# Patient Record
Sex: MALE | Race: WHITE | Employment: OTHER | URBAN - METROPOLITAN AREA
[De-identification: names, ages, dates, MRNs, and addresses within clinical notes are randomized per-mention and may not be internally consistent; named-entity substitution may affect disease eponyms.]

---

## 2024-09-03 ENCOUNTER — OFFICE VISIT (OUTPATIENT)
Dept: BARIATRICS/WEIGHT MGMT | Age: 64
End: 2024-09-03
Payer: MEDICARE

## 2024-09-03 ENCOUNTER — HOSPITAL ENCOUNTER (OUTPATIENT)
Age: 64
Setting detail: SPECIMEN
Discharge: HOME | End: 2024-09-03

## 2024-09-03 VITALS
HEART RATE: 80 BPM | HEIGHT: 71 IN | BODY MASS INDEX: 36.82 KG/M2 | WEIGHT: 263 LBS | DIASTOLIC BLOOD PRESSURE: 70 MMHG | SYSTOLIC BLOOD PRESSURE: 120 MMHG

## 2024-09-03 DIAGNOSIS — F32.A DEPRESSION, UNSPECIFIED DEPRESSION TYPE: ICD-10-CM

## 2024-09-03 DIAGNOSIS — G47.33 OSA (OBSTRUCTIVE SLEEP APNEA): Primary | ICD-10-CM

## 2024-09-03 DIAGNOSIS — E66.9 OBESITY (BMI 30-39.9): ICD-10-CM

## 2024-09-03 DIAGNOSIS — G47.33 OSA (OBSTRUCTIVE SLEEP APNEA): ICD-10-CM

## 2024-09-03 LAB
BASOPHILS # BLD: 0.04 K/UL (ref 0–0.2)
BASOPHILS NFR BLD: 1 % (ref 0–2)
EOSINOPHIL # BLD: 0.22 K/UL (ref 0–0.44)
EOSINOPHILS RELATIVE PERCENT: 5 % (ref 1–4)
ERYTHROCYTE [DISTWIDTH] IN BLOOD BY AUTOMATED COUNT: 14.6 % (ref 11.8–14.4)
EST. AVERAGE GLUCOSE BLD GHB EST-MCNC: 128 MG/DL
HBA1C MFR BLD: 6.1 % (ref 4–6)
HCT VFR BLD AUTO: 46.3 % (ref 40.7–50.3)
HGB BLD-MCNC: 15.4 G/DL (ref 13–17)
IMM GRANULOCYTES # BLD AUTO: <0.03 K/UL (ref 0–0.3)
IMM GRANULOCYTES NFR BLD: 0 %
LYMPHOCYTES NFR BLD: 1.11 K/UL (ref 1.1–3.7)
LYMPHOCYTES RELATIVE PERCENT: 23 % (ref 24–43)
MCH RBC QN AUTO: 29.2 PG (ref 25.2–33.5)
MCHC RBC AUTO-ENTMCNC: 33.3 G/DL (ref 28.4–34.8)
MCV RBC AUTO: 87.9 FL (ref 82.6–102.9)
MONOCYTES NFR BLD: 0.47 K/UL (ref 0.1–1.2)
MONOCYTES NFR BLD: 10 % (ref 3–12)
NEUTROPHILS NFR BLD: 61 % (ref 36–65)
NEUTS SEG NFR BLD: 2.91 K/UL (ref 1.5–8.1)
NRBC BLD-RTO: 0 PER 100 WBC
PLATELET # BLD AUTO: 165 K/UL (ref 138–453)
PMV BLD AUTO: 10.5 FL (ref 8.1–13.5)
RBC # BLD AUTO: 5.27 M/UL (ref 4.21–5.77)
RBC # BLD: ABNORMAL 10*6/UL
WBC OTHER # BLD: 4.8 K/UL (ref 3.5–11.3)

## 2024-09-03 PROCEDURE — 99204 OFFICE O/P NEW MOD 45 MIN: CPT | Performed by: NURSE PRACTITIONER

## 2024-09-03 PROCEDURE — 1123F ACP DISCUSS/DSCN MKR DOCD: CPT | Performed by: NURSE PRACTITIONER

## 2024-09-03 RX ORDER — FAMOTIDINE 20 MG/1
20 TABLET, FILM COATED ORAL NIGHTLY PRN
COMMUNITY
Start: 2022-01-20

## 2024-09-03 RX ORDER — IPRATROPIUM BROMIDE 21 UG/1
2 SPRAY, METERED NASAL 2 TIMES DAILY
COMMUNITY
Start: 2017-04-10

## 2024-09-03 RX ORDER — ALLOPURINOL 100 MG/1
1 TABLET ORAL
COMMUNITY

## 2024-09-03 RX ORDER — TESTOSTERONE CYPIONATE 1000 MG/10ML
0.5 INJECTION, SOLUTION INTRAMUSCULAR
COMMUNITY

## 2024-09-03 RX ORDER — ZOLPIDEM TARTRATE 12.5 MG/1
12.5 TABLET, FILM COATED, EXTENDED RELEASE ORAL NIGHTLY PRN
COMMUNITY
Start: 1900-01-01

## 2024-09-03 RX ORDER — TAMSULOSIN HYDROCHLORIDE 0.4 MG/1
0.4 CAPSULE ORAL DAILY
COMMUNITY
Start: 2017-08-20

## 2024-09-03 RX ORDER — MIRABEGRON 50 MG/1
50 TABLET, EXTENDED RELEASE ORAL DAILY
COMMUNITY

## 2024-09-03 RX ORDER — LANSOPRAZOLE 30 MG/1
30 CAPSULE, DELAYED RELEASE ORAL DAILY
COMMUNITY

## 2024-09-03 NOTE — PROGRESS NOTES
[facility], [facility] INVASIVE BARIATRIC SURG  [address]  [address]  [place] [id number]  Dept: [phone]  Fax: [phone]    MEDICATION WEIGHT LOSS MANAGEMENT PROGRAM  PROGRESS NOTE     Subjective:      Chief Complaint   Patient presents with    Consultation    Discuss Medications    Weight Loss         Eber Escobar is a 63 y.o. male who is here today for evaluation and treatment of obesity. Patient cites health, increased physical ability as reasons for wanting to lose weight.    He states that he has recently retired. He states since the beginning of the year, he has struggled with stress and depression partially related to his job but also family issues and losses. He is starting to feel more hopeful now that he is retired and that stress is gone.     Obesity History    Period of greatest weight gain: approx 25 lbs during late adult years  Lowest adult weight: 200 lbs at age 28 with increased activity and a better diet  Highest adult weight: 275 lbs at age 63 lbs d/t stress, depression, inactivity and increased hungry  Amount of time at present weight: a couple of months      History of Weight Loss Efforts  Greatest amount of weight lost: 46 lbs in mid adult years with [facility]  That worked for several years but he states they ended up moving out of the area.   Successful weight loss techniques attempted:  [facility] - (metabolic profile system)      He was on adipex last year from August til November - he is unsure if he lost any weight during that time.     He has been on Keto, WW, atkins, nutrisystem, calorie restriction, low fat diet, low carb diet, Noom. He states they have all been successful to an extent. They work while he is on them but its not sustained.     Current Exercise Habits no regular exercise    Current Eating Habits  Number of regular meals per day: 2.5  Number of snacking episodes per day: several  Who shops for food? patient and wife  Who

## 2024-09-04 LAB
ALBUMIN SERPL-MCNC: 4.4 G/DL (ref 3.5–5.2)
ALBUMIN/GLOB SERPL: 2 {RATIO} (ref 1–2.5)
ALP SERPL-CCNC: 59 U/L (ref 40–129)
ALT SERPL-CCNC: 34 U/L (ref 10–50)
ANION GAP SERPL CALCULATED.3IONS-SCNC: 12 MMOL/L (ref 9–16)
AST SERPL-CCNC: 37 U/L (ref 10–50)
BILIRUB SERPL-MCNC: 0.6 MG/DL (ref 0–1.2)
BUN SERPL-MCNC: 23 MG/DL (ref 8–23)
CALCIUM SERPL-MCNC: 9.1 MG/DL (ref 8.6–10.4)
CHLORIDE SERPL-SCNC: 106 MMOL/L (ref 98–107)
CO2 SERPL-SCNC: 20 MMOL/L (ref 20–31)
CREAT SERPL-MCNC: 1.4 MG/DL (ref 0.7–1.2)
GFR, ESTIMATED: 56 ML/MIN/1.73M2
GLUCOSE SERPL-MCNC: 86 MG/DL (ref 74–99)
POTASSIUM SERPL-SCNC: 4.3 MMOL/L (ref 3.7–5.3)
PROT SERPL-MCNC: 6.7 G/DL (ref 6.6–8.7)
SODIUM SERPL-SCNC: 138 MMOL/L (ref 136–145)
T4 FREE SERPL-MCNC: 0.9 NG/DL (ref 0.92–1.68)
TSH SERPL DL<=0.05 MIU/L-ACNC: 1.58 UIU/ML (ref 0.27–4.2)

## 2024-10-08 DIAGNOSIS — G47.33 OSA (OBSTRUCTIVE SLEEP APNEA): ICD-10-CM

## 2024-10-08 DIAGNOSIS — E66.9 OBESITY (BMI 30-39.9): ICD-10-CM

## 2024-10-08 NOTE — TELEPHONE ENCOUNTER
We will see him tomorrow and see how he is doing with it the medication. Even if I send the order over today, they wont start compounding until Thursday. If I send it over tomorrow, they will still compound it Thursday. All prescriptions sent Monday, Tuesday or Wednesday are compounded on the same day. If I do this refill, he will be on the starter dose and will need to wait another month for a dose increase

## 2024-10-08 NOTE — TELEPHONE ENCOUNTER
Has appt10/09/24 asking if script can be sent in ASAP. He has been out of town and just returned.

## 2024-10-09 ENCOUNTER — OFFICE VISIT (OUTPATIENT)
Dept: BARIATRICS/WEIGHT MGMT | Age: 64
End: 2024-10-09
Payer: MEDICARE

## 2024-10-09 VITALS
WEIGHT: 253 LBS | DIASTOLIC BLOOD PRESSURE: 72 MMHG | SYSTOLIC BLOOD PRESSURE: 132 MMHG | HEIGHT: 71 IN | OXYGEN SATURATION: 96 % | HEART RATE: 82 BPM | BODY MASS INDEX: 35.42 KG/M2

## 2024-10-09 DIAGNOSIS — E66.9 OBESITY (BMI 30-39.9): ICD-10-CM

## 2024-10-09 DIAGNOSIS — R73.03 PREDIABETES: Primary | ICD-10-CM

## 2024-10-09 DIAGNOSIS — G47.33 OSA (OBSTRUCTIVE SLEEP APNEA): ICD-10-CM

## 2024-10-09 DIAGNOSIS — N18.31 STAGE 3A CHRONIC KIDNEY DISEASE (HCC): ICD-10-CM

## 2024-10-09 PROCEDURE — 1123F ACP DISCUSS/DSCN MKR DOCD: CPT | Performed by: NURSE PRACTITIONER

## 2024-10-09 PROCEDURE — 99214 OFFICE O/P EST MOD 30 MIN: CPT | Performed by: NURSE PRACTITIONER

## 2024-10-09 RX ORDER — AZELASTINE 1 MG/ML
SPRAY, METERED NASAL
COMMUNITY

## 2024-10-09 RX ORDER — SYRINGE WITH NEEDLE, 1 ML 25GX5/8"
SYRINGE, EMPTY DISPOSABLE MISCELLANEOUS
COMMUNITY
Start: 2024-09-13

## 2024-10-09 RX ORDER — NEEDLES, DISPOSABLE 25GX5/8"
NEEDLE, DISPOSABLE MISCELLANEOUS
COMMUNITY
Start: 2024-09-14

## 2024-10-09 RX ORDER — ALPRAZOLAM 0.5 MG
TABLET ORAL
COMMUNITY

## 2024-10-09 ASSESSMENT — ENCOUNTER SYMPTOMS
VOMITING: 0
NAUSEA: 0
COUGH: 0
CONSTIPATION: 0
WHEEZING: 0
ABDOMINAL PAIN: 0
CHEST TIGHTNESS: 0
SHORTNESS OF BREATH: 0

## 2024-10-09 NOTE — PROGRESS NOTES
[facility] INVASIVE BARIATRIC SURG  [address]  [address]  [place] [id number]  Dept: [phone]    MEDICATION WEIGHT LOSS MANAGEMENT PROGRAM  PROGRESS NOTE     CC: Weight Loss    Patient: Eber Escobar      Service Date: 10/9/2024  Visit:   1 month follow up on medications    Medical Record #: [id number]  Current Visit Weight Information  Weight: 114.8 kg (253 lb)   BMI: Body mass index is 35.29 kg/m².        History: 63 y.o. male who presents today for a 1 month follow up on weight loss medication. Patient has been following monthly with our office for the management of obesity since September 2024.    Medication: Semaglutide through [facility]  Amount of weight loss in the last month: 10 lbs  Amount of weight loss total: 10 lbs  Current dose: 0.3 mg  Side Effects: nausea initially but resolved now. Otherwise tolerated well.     He states he was getting stephens quicker, hunger was down a lot. He states it was nice to not feel hungry all the time. He states the nausea he had was only 1 night and he thinks he might have overate. He states he is feeling more satified with smaller portions. He states before the medication, when he felt hungry he would load up his plate and have large portions to try and satifiy the hunger.     He has been working on increasing the protein in his diet.     Labs work completed - + CKD stage 3 which he states PCP has been watching along with urology. It appears stable. A1C did show prediabetes. He has an appt scheduled on 10/31 with PCP. He states he also needs to schedule with urology.     Height: 1.803 m (5' 11\")  Highest Weight:   263 lbs      Exercising?   [x] Yes    [] No   He went hiking for a couple of weeks this past month  Review of Systems:     Review of Systems   Constitutional:  Negative for appetite change, chills, diaphoresis, fatigue and fever.        + obesity   Respiratory:  Negative 
for 3 meals and 1-2 snacks daily.    Discuss activity with physician and determine a plan for remaining active.    Monitor/Evaluate:  Diet tolerance, protein intake, fluid intake, frequency of meals/snacks, activity, and follow-up monthly/prn.      Meena Forrest MS, RD, LD  Clinical Dietitian  [facility]  [phone]

## 2024-11-06 ENCOUNTER — OFFICE VISIT (OUTPATIENT)
Dept: BARIATRICS/WEIGHT MGMT | Age: 64
End: 2024-11-06
Payer: MEDICARE

## 2024-11-06 VITALS
WEIGHT: 249 LBS | HEART RATE: 90 BPM | BODY MASS INDEX: 34.86 KG/M2 | SYSTOLIC BLOOD PRESSURE: 100 MMHG | HEIGHT: 71 IN | DIASTOLIC BLOOD PRESSURE: 66 MMHG

## 2024-11-06 DIAGNOSIS — R73.03 PREDIABETES: Primary | ICD-10-CM

## 2024-11-06 DIAGNOSIS — E66.9 OBESITY (BMI 30-39.9): ICD-10-CM

## 2024-11-06 DIAGNOSIS — G47.33 OSA (OBSTRUCTIVE SLEEP APNEA): ICD-10-CM

## 2024-11-06 PROCEDURE — 99213 OFFICE O/P EST LOW 20 MIN: CPT | Performed by: NURSE PRACTITIONER

## 2024-11-06 PROCEDURE — 1123F ACP DISCUSS/DSCN MKR DOCD: CPT | Performed by: NURSE PRACTITIONER

## 2024-11-06 PROCEDURE — 1159F MED LIST DOCD IN RCRD: CPT | Performed by: NURSE PRACTITIONER

## 2024-11-06 ASSESSMENT — ENCOUNTER SYMPTOMS
WHEEZING: 0
CHEST TIGHTNESS: 0
VOMITING: 0
ABDOMINAL PAIN: 0
NAUSEA: 0
COUGH: 0
CONSTIPATION: 0
SHORTNESS OF BREATH: 0

## 2024-11-06 NOTE — PROGRESS NOTES
[facility] INVASIVE BARIATRIC SURG  [address]  [address]  [facility] [address]  Dept: [phone]    MEDICATION WEIGHT LOSS MANAGEMENT PROGRAM  PROGRESS NOTE     CC: Weight Loss    Patient: Eber Escobar      Service Date: 11/6/2024  Visit:   1 month follow up on medications    Medical Record #: [id number]  Current Visit Weight Information  Weight: 112.9 kg (249 lb)   BMI: Body mass index is 34.73 kg/m².        History: 64 y.o. male who presents today for a 1 month follow up on weight loss medication. Patient has been following monthly with our office for the management of obesity since September 2024.    Medication: Semaglutide through [facility]  Amount of weight loss in the last month: 4 lbs  Amount of weight loss total: 14 lbs  Current dose: 0.6 mg  Side Effects: none.     Denies nausea, vomiting, abdominal pain. He states he has felt more tired recently.     He admits this past month he struggled with a lot of discipline when it came to halloween time. He states they bought a lot of candy to pass out to trick or treaters but it rained so he got stuck with a lot of candy at home.  When he first started the medication he felt like smaller portions were more satisfying but he does not always feel that way despite having the dose increase last month.  He has had a hard time controlling his cravings.  He also states that his birthday was recently so he did not make the best choices around that time either.    He has been working on increasing the protein in his diet.       Height: 1.803 m (5' 11\")  Highest Weight:   263 lbs      Exercising?   [] Yes    [x] No   He states he is walking on average between 9867-1121 steps per day, active around the house and with yard work.    He admits that once he retired he wanted to start being more active and exercising routinely but he has not started that yet.  Review of Systems:     Review of

## 2024-11-06 NOTE — PROGRESS NOTES
Medical Nutrition Therapy for Non-Surgical Weight Loss  Nutrition Follow-Up: Weight Loss Medication    Nutrition Assessment:  Patient is taking semaglutide per PAOLA Johnson for weight loss. Patient has lost 4 lbs since last visit.   Patient's nutrition questions include suggestions for breakfast proteins.  Patient reports changes made since last month include tired, no energy and pain in legs/back.  Patient is eating three meals plus 1-2 snacks per day. Improved fruit and vegetable intake.  Patient is drinking at least  oz of water. Patient is typically drinking water, coffee with half and half. Sometimes drinks milk 2%. Alcohol more frequently on vacation but now decreased greatly.   Patient reports walking, yard work to remain active.  Discussed alternate breakfast options.  Provided Breakfast Ideas Handout(s).    24-hr diet recall scanned into chart.    Vitals:   Vitals:    11/06/24 1356   BP: 100/66   Site: Right Upper Arm   Position: Sitting   Cuff Size: Large Adult   Pulse: 90   Weight: 112.9 kg (249 lb)   Height: 1.803 m (5' 11\")      Body mass index is 34.73 kg/m².    Estimated Energy Needs:  Calorie (MSJ x AF - 500): 1800 kcals  Protein: 60-80 grams protein  Fluids: minimum 64 oz fluid    Nutrition Diagnosis:  Overweight/Obesity related to complex combination of decreased energy needs, disordered eating patterns, physical inactivity, and increased psychological/life stress as evidenced by Body mass index is 34.73 kg/m².    Nutrition Intervention:  Diet: Low-Fat Diet, 60-80 gm of protein, and 64 oz of fluid/day.    Nutrition Education: Nutrition Care Manual    Goal:   Eat a variety of fruits and vegetables, lean protein, whole grains and low-fat dairy.   Sip on water or sugar-free fluid throughout the day.   Aim for 3 meals and 1-2 snacks daily.    Discuss activity with physician and determine a plan for remaining active.    Monitor/Evaluate:  Diet tolerance, protein intake, fluid intake,

## 2024-12-04 ENCOUNTER — OFFICE VISIT (OUTPATIENT)
Dept: BARIATRICS/WEIGHT MGMT | Age: 64
End: 2024-12-04
Payer: MEDICARE

## 2024-12-04 VITALS
HEIGHT: 71 IN | SYSTOLIC BLOOD PRESSURE: 130 MMHG | WEIGHT: 257 LBS | BODY MASS INDEX: 35.98 KG/M2 | HEART RATE: 80 BPM | DIASTOLIC BLOOD PRESSURE: 70 MMHG

## 2024-12-04 DIAGNOSIS — R73.03 PREDIABETES: Primary | ICD-10-CM

## 2024-12-04 DIAGNOSIS — E66.9 OBESITY (BMI 30-39.9): ICD-10-CM

## 2024-12-04 DIAGNOSIS — G47.33 OSA (OBSTRUCTIVE SLEEP APNEA): ICD-10-CM

## 2024-12-04 PROCEDURE — 1123F ACP DISCUSS/DSCN MKR DOCD: CPT | Performed by: NURSE PRACTITIONER

## 2024-12-04 PROCEDURE — 1159F MED LIST DOCD IN RCRD: CPT | Performed by: NURSE PRACTITIONER

## 2024-12-04 PROCEDURE — 99214 OFFICE O/P EST MOD 30 MIN: CPT | Performed by: NURSE PRACTITIONER

## 2024-12-04 ASSESSMENT — ENCOUNTER SYMPTOMS
ABDOMINAL PAIN: 0
CONSTIPATION: 0
COUGH: 0
SHORTNESS OF BREATH: 0
WHEEZING: 0
VOMITING: 0
CHEST TIGHTNESS: 0
NAUSEA: 0

## 2024-12-04 NOTE — PROGRESS NOTES
Medical Nutrition Therapy for Non-Surgical Weight Loss  Nutrition Follow-Up: Weight Loss Medication    Nutrition Assessment:  Patient is taking semaglutide per PAOLA Johnson for weight loss. Patient has lost 0 lbs since last visit, +8 lbs. Patient was off the medication for 2 weeks.   Patient is new to [facility].  Patient reports changes made since last month include not moving as much, did not focus on protein, was overeating without following previous nutrition medications.   Patient is eating 3 meals with 1-2 snacks.  Patient is drinking at least  oz of water. Patient is typically drinking water, coffee with half and half. Sometimes drinks milk 2%. Alcohol more frequently on vacation but now decreased greatly.   Patient reports not adding physical activity to daily life to remain active.   Discussed goal of focusing on protein, staying away from sugary foods. Reiterated importance of MyPlate if off of medication. Discussed giving self a routine/building new habits.    24-hr diet recall scanned into chart.    Vitals:   Vitals:    12/04/24 1036   BP: 130/70   Site: Right Upper Arm   Position: Sitting   Cuff Size: Large Adult   Pulse: 80   Weight: 116.6 kg (257 lb)   Height: 1.803 m (5' 11\")      Body mass index is 35.84 kg/m².    Estimated Energy Needs:  Calorie (MSJ x AF - 500): 1800 kcals  Protein: 60-80 grams protein  Fluids: minimum 64 oz fluid    Nutrition Diagnosis:  Overweight/Obesity related to complex combination of decreased energy needs, disordered eating patterns, physical inactivity, and increased psychological/life stress as evidenced by Body mass index is 35.84 kg/m².    Nutrition Intervention:  Diet: Low-Fat Diet, 60-80 gm of protein, and 64 oz of fluid/day.    Nutrition Education: Nutrition Care Manual    Goal:   Eat a variety of fruits and vegetables, lean protein, whole grains and low-fat dairy.   Sip on water or sugar-free fluid throughout the day.   Aim for 3 meals and 1-2 
the lack of discipline that comes from not working.       Height: 1.803 m (5' 11\")  Highest Weight:   263 lbs      Exercising?   [] Yes    [x] No   He states he is walking on average between 9261-4176 steps per day, active around the house and with yard work.    He admits that once he retired he wanted to start being more active and exercising routinely but he has not started that yet.  Review of Systems:     Review of Systems   Constitutional:  Negative for appetite change, chills, diaphoresis, fatigue and fever.        + obesity   Respiratory:  Negative for cough, chest tightness, shortness of breath and wheezing.    Cardiovascular:  Negative for chest pain and palpitations.   Gastrointestinal:  Negative for abdominal pain, constipation, nausea and vomiting.   Neurological:  Negative for dizziness, weakness, light-headedness and headaches.   Psychiatric/Behavioral:  Negative for agitation, dysphoric mood and sleep disturbance. The patient is not nervous/anxious.    All other systems reviewed and are negative.      Physical Assessment:     /70 (Site: Right Upper Arm, Position: Sitting, Cuff Size: Large Adult)   Pulse 80   Ht 1.803 m (5' 11\")   Wt 116.6 kg (257 lb)   BMI 35.84 kg/m²     Physical Exam  Vitals and nursing note reviewed.   Constitutional:       General: He is not in acute distress.     Appearance: Normal appearance. He is obese. He is not ill-appearing, toxic-appearing or diaphoretic.   HENT:      Head: Normocephalic and atraumatic.      Mouth/Throat:      Mouth: Mucous membranes are moist.   Cardiovascular:      Rate and Rhythm: Normal rate.   Pulmonary:      Effort: Pulmonary effort is normal.   Musculoskeletal:      Cervical back: Neck supple.   Skin:     General: Skin is warm and dry.   Neurological:      General: No focal deficit present.      Mental Status: He is alert and oriented to person, place, and time.      Gait: Gait normal.   Psychiatric:         Mood and Affect: Mood normal.

## 2024-12-30 ENCOUNTER — OFFICE VISIT (OUTPATIENT)
Dept: BARIATRICS/WEIGHT MGMT | Age: 64
End: 2024-12-30
Payer: MEDICARE

## 2024-12-30 ENCOUNTER — HOSPITAL ENCOUNTER (OUTPATIENT)
Age: 64
Setting detail: SPECIMEN
Discharge: HOME | End: 2024-12-30

## 2024-12-30 VITALS
HEIGHT: 71 IN | BODY MASS INDEX: 35.84 KG/M2 | WEIGHT: 256 LBS | DIASTOLIC BLOOD PRESSURE: 64 MMHG | HEART RATE: 80 BPM | SYSTOLIC BLOOD PRESSURE: 120 MMHG

## 2024-12-30 DIAGNOSIS — G47.30 SEVERE SLEEP APNEA: ICD-10-CM

## 2024-12-30 DIAGNOSIS — R53.83 FATIGUE, UNSPECIFIED TYPE: ICD-10-CM

## 2024-12-30 DIAGNOSIS — G47.30 SEVERE SLEEP APNEA: Primary | ICD-10-CM

## 2024-12-30 DIAGNOSIS — N18.31 STAGE 3A CHRONIC KIDNEY DISEASE (HCC): ICD-10-CM

## 2024-12-30 DIAGNOSIS — E66.9 OBESITY (BMI 30-39.9): ICD-10-CM

## 2024-12-30 LAB
25(OH)D3 SERPL-MCNC: 40.2 NG/ML (ref 30–100)
ALBUMIN SERPL-MCNC: 4.4 G/DL (ref 3.5–5.2)
ALBUMIN/GLOB SERPL: 1.9 {RATIO} (ref 1–2.5)
ALP SERPL-CCNC: 60 U/L (ref 40–129)
ALT SERPL-CCNC: 35 U/L (ref 10–50)
ANION GAP SERPL CALCULATED.3IONS-SCNC: 12 MMOL/L (ref 9–16)
AST SERPL-CCNC: 25 U/L (ref 10–50)
BASOPHILS # BLD: 0.05 K/UL (ref 0–0.2)
BASOPHILS NFR BLD: 1 % (ref 0–2)
BILIRUB SERPL-MCNC: 0.6 MG/DL (ref 0–1.2)
BUN SERPL-MCNC: 17 MG/DL (ref 8–23)
CALCIUM SERPL-MCNC: 9.5 MG/DL (ref 8.6–10.4)
CHLORIDE SERPL-SCNC: 102 MMOL/L (ref 98–107)
CO2 SERPL-SCNC: 25 MMOL/L (ref 20–31)
CREAT SERPL-MCNC: 1.5 MG/DL (ref 0.7–1.2)
EOSINOPHIL # BLD: 0.27 K/UL (ref 0–0.44)
EOSINOPHILS RELATIVE PERCENT: 4 % (ref 1–4)
ERYTHROCYTE [DISTWIDTH] IN BLOOD BY AUTOMATED COUNT: 13.5 % (ref 11.8–14.4)
FOLATE SERPL-MCNC: 29.6 NG/ML (ref 4.8–24.2)
GFR, ESTIMATED: 51 ML/MIN/1.73M2
GLUCOSE SERPL-MCNC: 80 MG/DL (ref 74–99)
HCT VFR BLD AUTO: 50.9 % (ref 40.7–50.3)
HGB BLD-MCNC: 17.1 G/DL (ref 13–17)
IMM GRANULOCYTES # BLD AUTO: 0.03 K/UL (ref 0–0.3)
IMM GRANULOCYTES NFR BLD: 0 %
LYMPHOCYTES NFR BLD: 1.52 K/UL (ref 1.1–3.7)
LYMPHOCYTES RELATIVE PERCENT: 21 % (ref 24–43)
MAGNESIUM SERPL-MCNC: 2 MG/DL (ref 1.6–2.4)
MCH RBC QN AUTO: 29.5 PG (ref 25.2–33.5)
MCHC RBC AUTO-ENTMCNC: 33.6 G/DL (ref 28.4–34.8)
MCV RBC AUTO: 87.9 FL (ref 82.6–102.9)
MONOCYTES NFR BLD: 0.65 K/UL (ref 0.1–1.2)
MONOCYTES NFR BLD: 9 % (ref 3–12)
NEUTROPHILS NFR BLD: 65 % (ref 36–65)
NEUTS SEG NFR BLD: 4.76 K/UL (ref 1.5–8.1)
NRBC BLD-RTO: 0 PER 100 WBC
PLATELET # BLD AUTO: 197 K/UL (ref 138–453)
PMV BLD AUTO: 10.1 FL (ref 8.1–13.5)
POTASSIUM SERPL-SCNC: 4.6 MMOL/L (ref 3.7–5.3)
PROT SERPL-MCNC: 6.7 G/DL (ref 6.6–8.7)
RBC # BLD AUTO: 5.79 M/UL (ref 4.21–5.77)
SODIUM SERPL-SCNC: 139 MMOL/L (ref 136–145)
VIT B12 SERPL-MCNC: 710 PG/ML (ref 232–1245)
WBC OTHER # BLD: 7.3 K/UL (ref 3.5–11.3)

## 2024-12-30 PROCEDURE — 1123F ACP DISCUSS/DSCN MKR DOCD: CPT | Performed by: NURSE PRACTITIONER

## 2024-12-30 PROCEDURE — 1159F MED LIST DOCD IN RCRD: CPT | Performed by: NURSE PRACTITIONER

## 2024-12-30 PROCEDURE — 99214 OFFICE O/P EST MOD 30 MIN: CPT | Performed by: NURSE PRACTITIONER

## 2024-12-30 RX ORDER — TIRZEPATIDE 2.5 MG/.5ML
INJECTION, SOLUTION SUBCUTANEOUS
Qty: 2 ML | Refills: 0 | Status: SHIPPED | OUTPATIENT
Start: 2024-12-30

## 2024-12-30 RX ORDER — TIRZEPATIDE 2.5 MG/.5ML
2.5 INJECTION, SOLUTION SUBCUTANEOUS WEEKLY
Qty: 2 ML | Refills: 0 | Status: SHIPPED | OUTPATIENT
Start: 2024-12-30 | End: 2024-12-30

## 2024-12-30 ASSESSMENT — ENCOUNTER SYMPTOMS
COUGH: 0
ABDOMINAL PAIN: 0
CHEST TIGHTNESS: 0
WHEEZING: 0
VOMITING: 0
SHORTNESS OF BREATH: 0
CONSTIPATION: 0
NAUSEA: 0

## 2024-12-30 NOTE — TELEPHONE ENCOUNTER
Please request that the pharmacy send over the prior auth. On 12/20 it was approved for FERNY and supposed to be covered on Medicare

## 2024-12-30 NOTE — PROGRESS NOTES
[facility] INVASIVE BARIATRIC SURG  [address]  [address]  [place] [id number]  Dept: [phone]    MEDICATION WEIGHT LOSS MANAGEMENT PROGRAM  PROGRESS NOTE     CC: Weight Loss    Patient: Eber Escobar      Service Date: 12/30/2024  Visit:   1 month follow up on medications    Medical Record #: [id number]  Current Visit Weight Information  Weight: 116.1 kg (256 lb)   BMI: Body mass index is 35.7 kg/m².        History: 64 y.o. male who presents today for a 1 month follow up on weight loss medication. Patient has been following monthly with our office for the management of obesity since September 2024.    Medication: Semaglutide through [facility]  Amount of weight loss in the last month: 1 lbs  Amount of weight loss total: 7 lbs  Current dose: 1.2 mg  Side Effects: nausea.     He denies diarrhea, constipation, abdomen pain.     Patient complains of feeling tired and he states \"lethargic\". He has been saying that his fatigue is possibly related to him having a lack of structure since he retired but he has been consistent with the fatigue compliant since he started the medication also.     He has noticed also that he is feeling shaky prior to eating. He has a glucometer at home but his equipment is outdated so he hasn't checked his blood sugars. He is prediabetic but not on any other medication that could be causing hypoglycemia.    He states he recently heard that zepbound got approved for the treatment of FERNY. He has had severe FERNY for the past 20 years. He is complaint with his cpap nightly. He states he is able to see the occurrence of hypoxia on his machine and it states 0.6 events per hour. He states if he remembers correctly, before his cpap he was having over 20 evens per hour. He sees Dr. Clark for pulmonology through [facility].      Height: 1.803 m (5' 11\")  Highest Weight:   263 lbs      Exercising?   [] Yes    [x] No

## 2024-12-30 NOTE — TELEPHONE ENCOUNTER
Called [facility] and spoke with Atif the pharmacist who will fax the prior auth to us

## 2025-01-01 LAB — VIT B1 PYROPHOSHATE BLD-SCNC: 165 NMOL/L (ref 70–180)

## 2025-01-02 RX ORDER — TIRZEPATIDE 2.5 MG/.5ML
INJECTION, SOLUTION SUBCUTANEOUS
Refills: 0 | OUTPATIENT
Start: 2025-01-02

## 2025-01-27 ENCOUNTER — OFFICE VISIT (OUTPATIENT)
Dept: BARIATRICS/WEIGHT MGMT | Age: 65
End: 2025-01-27
Payer: MEDICARE

## 2025-01-27 VITALS
DIASTOLIC BLOOD PRESSURE: 70 MMHG | BODY MASS INDEX: 36.82 KG/M2 | HEIGHT: 71 IN | WEIGHT: 263 LBS | HEART RATE: 90 BPM | SYSTOLIC BLOOD PRESSURE: 100 MMHG

## 2025-01-27 DIAGNOSIS — G47.30 SEVERE SLEEP APNEA: Primary | ICD-10-CM

## 2025-01-27 DIAGNOSIS — R53.83 FATIGUE, UNSPECIFIED TYPE: ICD-10-CM

## 2025-01-27 DIAGNOSIS — E66.9 OBESITY (BMI 30-39.9): ICD-10-CM

## 2025-01-27 DIAGNOSIS — N18.31 STAGE 3A CHRONIC KIDNEY DISEASE (HCC): ICD-10-CM

## 2025-01-27 PROCEDURE — 1159F MED LIST DOCD IN RCRD: CPT | Performed by: NURSE PRACTITIONER

## 2025-01-27 PROCEDURE — 1123F ACP DISCUSS/DSCN MKR DOCD: CPT | Performed by: NURSE PRACTITIONER

## 2025-01-27 PROCEDURE — 99214 OFFICE O/P EST MOD 30 MIN: CPT | Performed by: NURSE PRACTITIONER

## 2025-01-27 ASSESSMENT — ENCOUNTER SYMPTOMS
COUGH: 0
WHEEZING: 0
ABDOMINAL PAIN: 0
CONSTIPATION: 0
CHEST TIGHTNESS: 0
VOMITING: 0
SHORTNESS OF BREATH: 0
NAUSEA: 0

## 2025-01-27 NOTE — PROGRESS NOTES
[facility] INVASIVE BARIATRIC SURG  [address]  [address]  [facility] [address]  Dept: [phone]    MEDICATION WEIGHT LOSS MANAGEMENT PROGRAM  PROGRESS NOTE     CC: Weight Loss    Patient: Eber Escobar      Service Date: 1/27/2025  Visit:   1 month follow up on medications    Medical Record #: [id number]  Current Visit Weight Information  Weight: 119.3 kg (263 lb)   BMI: Body mass index is 36.68 kg/m².        History: 64 y.o. male who presents today for a 1 month follow up on weight loss medication. Patient has been following monthly with our office for the management of obesity since September 2024.    Patient was taking Semaglutide through [facility] from September to December. He had lost 7 lbs but he was having a lot of fatigue and feeling shaky at times. He was up to 1.2mg and in the last couple of months didn't have much weight loss.   The fatigue was also associated with feeling down, low energy. 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     Zepbound had gotten approved for FERNY and he has severe FERNY. I advised to stop compounded semaglutide and I worked on getting zepbound approved. I was able to get the medication approved and he has started the 2.5mg dose. He states his appetite is really high right now but he is feeling stephens quicker.     Unfortunately, he still feels a lot of fatigue, low energy and sleepiness in the afternoon. It appears like it was likely not related to the semaglutide but happy he is able to start zepbound as it is much more affordable.    He states he is getting up every 2-2 1/2 hours to go urinate - he has an appt with urology coming up.   He has needed to start using his old BIPAP

## 2025-01-27 NOTE — PROGRESS NOTES
Medical Nutrition Therapy for Non-Surgical Weight Loss  Nutrition Follow-Up: Weight Loss Medication    Nutrition Assessment:  Patient is taking zepbound per PAOLA Johnson for weight loss. Patient has lost 0 lbs since last visit. Patient is on a smaller dose than the semaglutide so is still feeling lots of hunger.  Patient reports lost of poor choices since last appointment.   Patient is eating 3 meals daily with no snacks. Patient reports will feel hungry after dinner..  Patient is drinking at least  oz of water. Patient is typically drinking water, coffee with half and half, occasionally milk 2%.   Patient reports not adding physical activity to daily life to remain active. Patient reports plan to walk 3 days this week.  Discussed addition of snacks in between meals if feeling hungry at night, different exercise options    24-hr diet recall scanned into chart.    Vitals:   Vitals:    01/27/25 1317   BP: 100/70   Site: Right Upper Arm   Position: Sitting   Cuff Size: Large Adult   Pulse: 90   Weight: 119.3 kg (263 lb)   Height: 1.803 m (5' 11\")      Body mass index is 36.68 kg/m².    Estimated Energy Needs:  Calorie (MSJ x AF - 500): 1800 kcals  Protein: 60-80 grams protein  Fluids: minimum 64 oz fluid    Nutrition Diagnosis:  Overweight/Obesity related to complex combination of decreased energy needs, disordered eating patterns, physical inactivity, and increased psychological/life stress as evidenced by Body mass index is 36.68 kg/m².    Nutrition Intervention:  Diet: Low-Fat Diet, 60-80 gm of protein, and 64 oz of fluid/day.    Nutrition Education: Nutrition Care Manual    Goal:   Eat a variety of fruits and vegetables, lean protein, whole grains and low-fat dairy.   Sip on water or sugar-free fluid throughout the day.   Aim for 3 meals and 1-2 snacks daily.    Discuss activity with physician and determine a plan for remaining active.    Monitor/Evaluate:  Diet tolerance, protein intake, fluid

## 2025-02-24 ENCOUNTER — OFFICE VISIT (OUTPATIENT)
Dept: BARIATRICS/WEIGHT MGMT | Age: 65
End: 2025-02-24
Payer: MEDICARE

## 2025-02-24 VITALS
SYSTOLIC BLOOD PRESSURE: 132 MMHG | HEIGHT: 71 IN | HEART RATE: 97 BPM | DIASTOLIC BLOOD PRESSURE: 86 MMHG | BODY MASS INDEX: 37.52 KG/M2 | WEIGHT: 268 LBS | OXYGEN SATURATION: 97 %

## 2025-02-24 DIAGNOSIS — E66.812 CLASS 2 SEVERE OBESITY DUE TO EXCESS CALORIES WITH SERIOUS COMORBIDITY AND BODY MASS INDEX (BMI) OF 37.0 TO 37.9 IN ADULT: ICD-10-CM

## 2025-02-24 DIAGNOSIS — E66.01 CLASS 2 SEVERE OBESITY DUE TO EXCESS CALORIES WITH SERIOUS COMORBIDITY AND BODY MASS INDEX (BMI) OF 37.0 TO 37.9 IN ADULT: ICD-10-CM

## 2025-02-24 DIAGNOSIS — G47.30 SEVERE SLEEP APNEA: Primary | ICD-10-CM

## 2025-02-24 PROCEDURE — 99214 OFFICE O/P EST MOD 30 MIN: CPT | Performed by: NURSE PRACTITIONER

## 2025-02-24 PROCEDURE — 1123F ACP DISCUSS/DSCN MKR DOCD: CPT | Performed by: NURSE PRACTITIONER

## 2025-02-24 PROCEDURE — 1159F MED LIST DOCD IN RCRD: CPT | Performed by: NURSE PRACTITIONER

## 2025-02-24 RX ORDER — DESMOPRESSIN ACETATE 0.2 MG/1
TABLET ORAL
COMMUNITY
Start: 2025-01-30

## 2025-02-24 ASSESSMENT — ENCOUNTER SYMPTOMS
CHEST TIGHTNESS: 0
NAUSEA: 0
VOMITING: 0
COUGH: 0
ABDOMINAL PAIN: 0
WHEEZING: 0
CONSTIPATION: 0
SHORTNESS OF BREATH: 0

## 2025-02-24 NOTE — PROGRESS NOTES
[facility] INVASIVE BARIATRIC SURG  [address]  [address]  [facility] [facility] [address]  Dept: [phone]    MEDICATION WEIGHT LOSS MANAGEMENT PROGRAM  PROGRESS NOTE     CC: Weight Loss    Patient: Eber Escobar      Service Date: 2/24/2025  Visit:   1 month follow up on medications    Medical Record #: [id number]  Current Visit Weight Information  Weight: 121.6 kg (268 lb)   BMI: Body mass index is 37.38 kg/m².        History: 64 y.o. male who presents today for a 1 month follow up on weight loss medication. Patient has been following monthly with our office for the management of obesity since September 2024.    Patient was taking Semaglutide through [facility] from September to December. He had lost 7 lbs but he was having a lot of fatigue and feeling shaky at times. He was up to 1.2mg and in the last couple of months didn't have much weight loss.   The fatigue was also associated with feeling down, low energy. He kept feeling like it was related to him retiring but he retired and started compounded semaglutide so it was hard to tell if the symptoms were side effects of the medication, r/t other chronic medical conditions or r/t his big life change with assisted. Zepbound was approved and started. He is up to 5mg weekly.   He states he still feels hungry throughout the day. He admits over the last month he has not been watching his diet, he has not started exercising. He has noticed that he is getting stephens faster.     He is still struggling with fatigue, low energy and sleepiness in the afternoon even with switching to zepbound. Previous labs have been normal.     He did meet with his urologist and myrbetriq was started d/t nocturia. He states there has been mild improvement. He only woke up 4 times last night to use the restroom.    He did go to his sleep specialists office due to his BIPAP not working. They have had his machine

## 2025-02-24 NOTE — PROGRESS NOTES
Medical Nutrition Therapy for Non-Surgical Weight Loss  Nutrition Follow-Up: Weight Loss Medication    Nutrition Assessment:  Patient is taking zepbound per PAOLA Johnson for weight loss. Patient has lost 0 lbs and gained 5 lbs since last visit. Patient is working on sleep hygiene.  Patient is eating 3 meals daily with no snacks. Patient reports will feel hungry after dinner..  Patient is drinking at least  oz of water. Patient is typically drinking water, coffee with half and half, occasionally milk 2%.   Patient reports not adding physical activity to daily life to remain active. Patient did sign up for [employer] to start walking with wife.   Allowed patient to troubleshoot ways to help with goals, discussed snack ideas with protein.     24-hr diet recall scanned into chart.    Vitals:   Vitals:    02/24/25 1129   BP: 132/86   Pulse: 97   SpO2: 97%   Weight: 121.6 kg (268 lb)   Height: 1.803 m (5' 11\")      Body mass index is 37.38 kg/m².    Estimated Energy Needs:  Calorie (MSJ x AF - 500): 1800 kcals  Protein: 60-80 grams protein  Fluids: minimum 64 oz fluid    Nutrition Diagnosis:  Overweight/Obesity related to complex combination of decreased energy needs, disordered eating patterns, physical inactivity, and increased psychological/life stress as evidenced by Body mass index is 37.38 kg/m².    Nutrition Intervention:  Diet: Low-Fat Diet, 60-80 gm of protein, and 64 oz of fluid/day.    Nutrition Education: Nutrition Care Manual    Goal:   Eat a variety of fruits and vegetables, lean protein, whole grains and low-fat dairy.   Sip on water or sugar-free fluid throughout the day.   Aim for 3 meals and 1-2 snacks daily.    Discuss activity with physician and determine a plan for remaining active.    Monitor/Evaluate:  Diet tolerance, protein intake, fluid intake, frequency of meals/snacks, activity, and follow-up monthly/prn.      Tim Luna RD, LD  Clinical Bariatric Dietitian  [facility]

## 2025-03-21 ENCOUNTER — TELEPHONE (OUTPATIENT)
Dept: BARIATRICS/WEIGHT MGMT | Age: 65
End: 2025-03-21

## 2025-03-21 NOTE — TELEPHONE ENCOUNTER
Patient called the office on 3/21/25.  Patient was unsure if the titration was going to automatically happen for his Tirzepatide, therefore he called into the office to request the increase.  Please advise

## 2025-04-14 ENCOUNTER — OFFICE VISIT (OUTPATIENT)
Dept: BARIATRICS/WEIGHT MGMT | Age: 65
End: 2025-04-14
Payer: MEDICARE

## 2025-04-14 VITALS
SYSTOLIC BLOOD PRESSURE: 116 MMHG | OXYGEN SATURATION: 98 % | WEIGHT: 257 LBS | BODY MASS INDEX: 35.98 KG/M2 | HEIGHT: 71 IN | DIASTOLIC BLOOD PRESSURE: 68 MMHG | HEART RATE: 78 BPM

## 2025-04-14 DIAGNOSIS — E66.812 CLASS 2 SEVERE OBESITY DUE TO EXCESS CALORIES WITH SERIOUS COMORBIDITY AND BODY MASS INDEX (BMI) OF 35.0 TO 35.9 IN ADULT: ICD-10-CM

## 2025-04-14 DIAGNOSIS — G47.30 SEVERE SLEEP APNEA: Primary | ICD-10-CM

## 2025-04-14 DIAGNOSIS — E66.01 CLASS 2 SEVERE OBESITY DUE TO EXCESS CALORIES WITH SERIOUS COMORBIDITY AND BODY MASS INDEX (BMI) OF 35.0 TO 35.9 IN ADULT: ICD-10-CM

## 2025-04-14 DIAGNOSIS — R73.03 PREDIABETES: ICD-10-CM

## 2025-04-14 DIAGNOSIS — N18.31 STAGE 3A CHRONIC KIDNEY DISEASE (HCC): ICD-10-CM

## 2025-04-14 PROCEDURE — 1123F ACP DISCUSS/DSCN MKR DOCD: CPT | Performed by: NURSE PRACTITIONER

## 2025-04-14 PROCEDURE — 1159F MED LIST DOCD IN RCRD: CPT | Performed by: NURSE PRACTITIONER

## 2025-04-14 PROCEDURE — 99213 OFFICE O/P EST LOW 20 MIN: CPT | Performed by: NURSE PRACTITIONER

## 2025-04-14 RX ORDER — TESTOSTERONE CYPIONATE 200 MG/ML
200 INJECTION, SOLUTION INTRAMUSCULAR
COMMUNITY
Start: 2025-04-10

## 2025-04-14 ASSESSMENT — ENCOUNTER SYMPTOMS
VOMITING: 0
COUGH: 0
CONSTIPATION: 0
SHORTNESS OF BREATH: 0
NAUSEA: 0
ABDOMINAL PAIN: 0
WHEEZING: 0
CHEST TIGHTNESS: 0

## 2025-04-14 NOTE — PROGRESS NOTES
[facility] INVASIVE BARIATRIC SURG  [address]  [address]  [place] [id number]  Dept: [phone]    MEDICATION WEIGHT LOSS MANAGEMENT PROGRAM  PROGRESS NOTE     CC: Weight Loss    Patient: Eber Escobar      Service Date: 4/14/2025  Visit:   6 week follow up on medications    Medical Record #: [id number]  Current Visit Weight Information  Weight: 116.6 kg (257 lb)   BMI: Body mass index is 35.84 kg/m².        History: 64 y.o. male who presents today for a follow up on weight loss medication. Patient has been following with our office for the management of obesity since September 2024. Initially was on Semaglutide through [facility] but wasn't not losing much weight. I was able to get zepbound approved in January.     He states he has been having sleep issues but working with his sleep specialist.     He has started the 10 mg but he states he is still struggling with feeling hungry also. He has taken 2 doses.   He has noticed he feels like he gets full quicker but not staying full longer.     He states that his mood is starting to improve. He has been really struggling with lack of motivation, fatigue and feeling down since he retired. He states the last 3 weeks he has started feeling a lot better. He has been more active lately.     He has lost 11 lbs since he was last seen.     He denies any side effects from the zepbound. Denies nausea, vomiting, abdominal pain, constipation, diarrhea.     Height: 1.803 m (5' 11\")  Highest Weight:   268 lbs      Exercising?   [] Yes    [x] No   Walking more, gardening. He did get silver sneakers but him and his wife only went once.   Review of Systems:     Review of Systems   Constitutional:  Positive for appetite change and fatigue (improved). Negative for chills, diaphoresis, fever and unexpected weight change.        + obesity   Respiratory:  Negative for cough, chest tightness, shortness of

## 2025-04-14 NOTE — PROGRESS NOTES
Medical Nutrition Therapy for Non-Surgical Weight Loss  Nutrition Follow-Up: Weight Loss Medication    Nutrition Assessment:  Patient is taking zepbound per PAOLA Johnson for weight loss. Patient has lost 11 lbs since last visit.   Patient reports changes made since last month include more activity. Still feels very hungry, most often at night.  Sleep is still not going well for him. Does sleep better when he exercises.  Patient is eating 2-3 times. Sometimes either skips breakfast or lunch. Snacks in mid afternoon and then eats dinner. Feels is not eating enough salads.  Patient is drinking at least 64 oz of water. Patient is typically drinking water, coffee with half and half, occasionally milk 2%.   Patient reports more outside work, some walking. Reports got a silver sneakers membership but he only went once.  Discussed consistent meals, every 3-5 hours with protein, fiber. Encouraged exercise.    24-hr diet recall scanned into chart.    Vitals:   Vitals:    04/14/25 1053   BP: 116/68   BP Site: Left Upper Arm   Patient Position: Sitting   BP Cuff Size: Large Adult   Pulse: 78   SpO2: 98%   Weight: 116.6 kg (257 lb)   Height: 1.803 m (5' 11\")      Body mass index is 35.84 kg/m².    Estimated Energy Needs:  Calorie (MSJ x AF - 500): 1800 kcals  Protein: 60-80 grams protein  Fluids: minimum 64 oz fluid    Nutrition Diagnosis:  Overweight/Obesity related to complex combination of decreased energy needs, disordered eating patterns, physical inactivity, and increased psychological/life stress as evidenced by Body mass index is 35.84 kg/m².    Nutrition Intervention:  Diet: Low-Fat Diet, 60-80 gm of protein, and 64 oz of fluid/day.    Nutrition Education: Nutrition Care Manual    Goal:   Eat a variety of fruits and vegetables, lean protein, whole grains and low-fat dairy.   Sip on water or sugar-free fluid throughout the day.   Aim for 3 meals and 1-2 snacks daily.    Discuss activity with physician and

## 2025-04-23 DIAGNOSIS — E66.812 CLASS 2 SEVERE OBESITY DUE TO EXCESS CALORIES WITH SERIOUS COMORBIDITY AND BODY MASS INDEX (BMI) OF 35.0 TO 35.9 IN ADULT (HCC): ICD-10-CM

## 2025-04-23 DIAGNOSIS — G47.30 SEVERE SLEEP APNEA: Primary | ICD-10-CM

## 2025-04-23 DIAGNOSIS — E66.01 CLASS 2 SEVERE OBESITY DUE TO EXCESS CALORIES WITH SERIOUS COMORBIDITY AND BODY MASS INDEX (BMI) OF 35.0 TO 35.9 IN ADULT (HCC): ICD-10-CM

## 2025-05-12 ENCOUNTER — OFFICE VISIT (OUTPATIENT)
Dept: BARIATRICS/WEIGHT MGMT | Age: 65
End: 2025-05-12
Payer: MEDICARE

## 2025-05-12 VITALS
OXYGEN SATURATION: 96 % | BODY MASS INDEX: 34.86 KG/M2 | SYSTOLIC BLOOD PRESSURE: 106 MMHG | HEIGHT: 71 IN | DIASTOLIC BLOOD PRESSURE: 62 MMHG | WEIGHT: 249 LBS | HEART RATE: 100 BPM

## 2025-05-12 DIAGNOSIS — G47.30 SEVERE SLEEP APNEA: Primary | ICD-10-CM

## 2025-05-12 DIAGNOSIS — R73.03 PREDIABETES: ICD-10-CM

## 2025-05-12 DIAGNOSIS — E66.09 CLASS 1 OBESITY DUE TO EXCESS CALORIES WITH SERIOUS COMORBIDITY AND BODY MASS INDEX (BMI) OF 34.0 TO 34.9 IN ADULT: ICD-10-CM

## 2025-05-12 DIAGNOSIS — E66.811 CLASS 1 OBESITY DUE TO EXCESS CALORIES WITH SERIOUS COMORBIDITY AND BODY MASS INDEX (BMI) OF 34.0 TO 34.9 IN ADULT: ICD-10-CM

## 2025-05-12 PROCEDURE — 99214 OFFICE O/P EST MOD 30 MIN: CPT | Performed by: NURSE PRACTITIONER

## 2025-05-12 PROCEDURE — 1123F ACP DISCUSS/DSCN MKR DOCD: CPT | Performed by: NURSE PRACTITIONER

## 2025-05-12 PROCEDURE — 1159F MED LIST DOCD IN RCRD: CPT | Performed by: NURSE PRACTITIONER

## 2025-05-12 RX ORDER — ONDANSETRON 4 MG/1
4 TABLET, FILM COATED ORAL 3 TIMES DAILY PRN
Qty: 30 TABLET | Refills: 0 | Status: SHIPPED | OUTPATIENT
Start: 2025-05-12

## 2025-05-12 ASSESSMENT — ENCOUNTER SYMPTOMS
NAUSEA: 1
WHEEZING: 0
VOMITING: 0
CHEST TIGHTNESS: 0
SHORTNESS OF BREATH: 0
CONSTIPATION: 0
COUGH: 0
ABDOMINAL PAIN: 0

## 2025-05-12 NOTE — PROGRESS NOTES
[facility] INVASIVE BARIATRIC SURG  [address]  [address]  [place] [id number]  Dept: [phone]    MEDICATION WEIGHT LOSS MANAGEMENT PROGRAM  PROGRESS NOTE     CC: Weight Loss    Patient: Eber Escobar      Service Date: 5/12/2025  Visit:   6 week follow up on medications    Medical Record #: [id number]  Current Visit Weight Information  Weight: 112.9 kg (249 lb)   BMI: Body mass index is 34.73 kg/m².        History: 64 y.o. male who presents today for a follow up on weight loss medication. Patient has been following with our office for the management of obesity since September 2024. Initially was on Semaglutide through [facility] but wasn't not losing much weight. I was able to get zepbound approved in January. .     He is on the 12.5 mg dose. He has taken 2 injections so far.     He has been doing a lot of gardening and yard work. He is a lot more active the he was previously. His mood and energy levels are much improved over the last couple of months.     He has lost 8 lbs since he was last seen.     He has lost 19 lbs total since starting zepbound.     He denies any side effects from the zepbound except for occasional nausea. He states it doesn't last long, passes quickly. Otherwise tolerating the medication well.     He has finally started to notice appetite is decreased. He is getting stephens on smaller amounts and staying stephens longer.     Height: 1.803 m (5' 11\")  Highest Weight:   268 lbs      Exercising?   [] Yes    [x] No   Walking more, gardening.   Review of Systems:     Review of Systems   Constitutional:  Positive for appetite change. Negative for chills, diaphoresis, fatigue, fever and unexpected weight change.        + obesity   Respiratory:  Negative for cough, chest tightness, shortness of breath and wheezing.         + FERNY   Cardiovascular:  Negative for chest pain and palpitations.   Gastrointestinal:

## 2025-06-25 DIAGNOSIS — E66.09 CLASS 1 OBESITY DUE TO EXCESS CALORIES WITH SERIOUS COMORBIDITY AND BODY MASS INDEX (BMI) OF 34.0 TO 34.9 IN ADULT: ICD-10-CM

## 2025-06-25 DIAGNOSIS — E66.811 CLASS 1 OBESITY DUE TO EXCESS CALORIES WITH SERIOUS COMORBIDITY AND BODY MASS INDEX (BMI) OF 34.0 TO 34.9 IN ADULT: ICD-10-CM

## 2025-06-25 DIAGNOSIS — G47.30 SEVERE SLEEP APNEA: ICD-10-CM

## 2025-08-12 ENCOUNTER — OFFICE VISIT (OUTPATIENT)
Dept: BARIATRICS/WEIGHT MGMT | Age: 65
End: 2025-08-12
Payer: MEDICARE

## 2025-08-12 VITALS
WEIGHT: 226 LBS | OXYGEN SATURATION: 97 % | SYSTOLIC BLOOD PRESSURE: 104 MMHG | DIASTOLIC BLOOD PRESSURE: 64 MMHG | BODY MASS INDEX: 31.64 KG/M2 | HEART RATE: 103 BPM | HEIGHT: 71 IN

## 2025-08-12 DIAGNOSIS — E66.09 CLASS 1 OBESITY DUE TO EXCESS CALORIES WITH SERIOUS COMORBIDITY AND BODY MASS INDEX (BMI) OF 31.0 TO 31.9 IN ADULT: ICD-10-CM

## 2025-08-12 DIAGNOSIS — E66.811 CLASS 1 OBESITY DUE TO EXCESS CALORIES WITH SERIOUS COMORBIDITY AND BODY MASS INDEX (BMI) OF 31.0 TO 31.9 IN ADULT: ICD-10-CM

## 2025-08-12 DIAGNOSIS — G47.30 SEVERE SLEEP APNEA: Primary | ICD-10-CM

## 2025-08-12 PROCEDURE — 99214 OFFICE O/P EST MOD 30 MIN: CPT | Performed by: NURSE PRACTITIONER

## 2025-08-12 PROCEDURE — 1159F MED LIST DOCD IN RCRD: CPT | Performed by: NURSE PRACTITIONER

## 2025-08-12 PROCEDURE — 1123F ACP DISCUSS/DSCN MKR DOCD: CPT | Performed by: NURSE PRACTITIONER

## 2025-08-12 ASSESSMENT — ENCOUNTER SYMPTOMS
SHORTNESS OF BREATH: 0
CHEST TIGHTNESS: 0
CONSTIPATION: 0
ABDOMINAL PAIN: 0
WHEEZING: 0
COUGH: 0
VOMITING: 0
NAUSEA: 0

## 2025-08-18 ENCOUNTER — OFFICE VISIT (OUTPATIENT)
Age: 65
End: 2025-08-18

## 2025-08-18 VITALS
HEART RATE: 84 BPM | TEMPERATURE: 98.2 F | OXYGEN SATURATION: 96 % | DIASTOLIC BLOOD PRESSURE: 68 MMHG | BODY MASS INDEX: 32.3 KG/M2 | SYSTOLIC BLOOD PRESSURE: 115 MMHG | HEIGHT: 71 IN | WEIGHT: 230.7 LBS

## 2025-08-18 DIAGNOSIS — L23.7 ALLERGIC CONTACT DERMATITIS DUE TO URUSHIOL FROM EASTERN POISON IVY: Primary | ICD-10-CM

## 2025-08-18 DIAGNOSIS — T78.40XA ALLERGIC REACTION, INITIAL ENCOUNTER: ICD-10-CM

## 2025-08-18 RX ORDER — CETIRIZINE HYDROCHLORIDE 10 MG/1
10 TABLET ORAL DAILY
Qty: 30 TABLET | Refills: 0 | Status: SHIPPED | OUTPATIENT
Start: 2025-08-18

## 2025-08-18 RX ORDER — METHYLPREDNISOLONE 4 MG/1
TABLET ORAL
Qty: 21 TABLET | Refills: 0 | Status: SHIPPED | OUTPATIENT
Start: 2025-08-18 | End: 2025-08-24

## 2025-08-18 RX ORDER — CALAMINE 8% AND ZINC OXIDE 8% 160 MG/ML
20 LOTION TOPICAL 3 TIMES DAILY
Qty: 117 ML | Refills: 0 | Status: SHIPPED | OUTPATIENT
Start: 2025-08-18

## 2025-08-18 RX ORDER — DEXAMETHASONE SODIUM PHOSPHATE 10 MG/ML
10 INJECTION, SOLUTION INTRA-ARTICULAR; INTRALESIONAL; INTRAMUSCULAR; INTRAVENOUS; SOFT TISSUE ONCE
Status: DISCONTINUED | OUTPATIENT
Start: 2025-08-18 | End: 2025-08-18

## 2025-08-18 RX ORDER — DEXAMETHASONE SODIUM PHOSPHATE 10 MG/ML
10 INJECTION, SOLUTION INTRA-ARTICULAR; INTRALESIONAL; INTRAMUSCULAR; INTRAVENOUS; SOFT TISSUE ONCE
Status: COMPLETED | OUTPATIENT
Start: 2025-08-18 | End: 2025-08-18

## 2025-08-18 RX ORDER — BENZOCAINE/MENTHOL 6 MG-10 MG
LOZENGE MUCOUS MEMBRANE
Qty: 30 G | Refills: 1 | Status: SHIPPED | OUTPATIENT
Start: 2025-08-18 | End: 2025-08-25

## 2025-08-18 RX ADMIN — DEXAMETHASONE SODIUM PHOSPHATE 10 MG: 10 INJECTION, SOLUTION INTRA-ARTICULAR; INTRALESIONAL; INTRAMUSCULAR; INTRAVENOUS; SOFT TISSUE at 12:29

## 2025-08-18 ASSESSMENT — ENCOUNTER SYMPTOMS
COUGH: 0
RHINORRHEA: 0
EYE PAIN: 0
VOMITING: 0
NAIL CHANGES: 0
SHORTNESS OF BREATH: 0
DIARRHEA: 0
SORE THROAT: 0